# Patient Record
Sex: FEMALE | Race: BLACK OR AFRICAN AMERICAN | NOT HISPANIC OR LATINO | URBAN - METROPOLITAN AREA
[De-identification: names, ages, dates, MRNs, and addresses within clinical notes are randomized per-mention and may not be internally consistent; named-entity substitution may affect disease eponyms.]

---

## 2021-05-03 ENCOUNTER — TELEPHONE ENCOUNTER (OUTPATIENT)
Dept: URBAN - METROPOLITAN AREA CLINIC 92 | Facility: CLINIC | Age: 56
End: 2021-05-03

## 2022-04-30 ENCOUNTER — TELEPHONE ENCOUNTER (OUTPATIENT)
Dept: URBAN - METROPOLITAN AREA CLINIC 121 | Facility: CLINIC | Age: 57
End: 2022-04-30

## 2022-05-01 ENCOUNTER — TELEPHONE ENCOUNTER (OUTPATIENT)
Dept: URBAN - METROPOLITAN AREA CLINIC 121 | Facility: CLINIC | Age: 57
End: 2022-05-01

## 2022-05-01 RX ORDER — METRONIDAZOLE 500 MG/1
TABLET ORAL
Status: ACTIVE | COMMUNITY
Start: 2007-05-09

## 2022-05-01 RX ORDER — LEVOTHYROXINE SODIUM 25 UG/1
UNSURE OF DOSAGE TABLET ORAL
Status: ACTIVE | COMMUNITY
Start: 2007-05-09

## 2022-05-01 RX ORDER — IBUPROFEN 200 MG/1
UNSURE OF DOSAGE TABLET, FILM COATED ORAL
Status: ACTIVE | COMMUNITY
Start: 2007-05-09

## 2022-05-01 RX ORDER — AMLODIPINE BESYLATE 2.5 MG
UNSURE OF DOSAGE TABLET ORAL
Status: ACTIVE | COMMUNITY
Start: 2007-05-09

## 2022-05-01 RX ORDER — HYDROCHLOROTHIAZIDE 25 MG/1
UNSURE OF DOSAGE TABLET ORAL
Status: ACTIVE | COMMUNITY
Start: 2007-05-09

## 2022-06-13 ENCOUNTER — OFFICE VISIT (OUTPATIENT)
Dept: URBAN - METROPOLITAN AREA CLINIC 118 | Facility: CLINIC | Age: 57
End: 2022-06-13
Payer: COMMERCIAL

## 2022-06-13 ENCOUNTER — DASHBOARD ENCOUNTERS (OUTPATIENT)
Age: 57
End: 2022-06-13

## 2022-06-13 VITALS
DIASTOLIC BLOOD PRESSURE: 76 MMHG | BODY MASS INDEX: 37.88 KG/M2 | HEIGHT: 70 IN | HEART RATE: 82 BPM | WEIGHT: 264.6 LBS | TEMPERATURE: 97.2 F | SYSTOLIC BLOOD PRESSURE: 117 MMHG

## 2022-06-13 DIAGNOSIS — R19.7 DIARRHEA, UNSPECIFIED TYPE: ICD-10-CM

## 2022-06-13 DIAGNOSIS — K92.1 BLOOD IN STOOL: ICD-10-CM

## 2022-06-13 PROCEDURE — 46611 ANOSCOPY: CPT | Performed by: INTERNAL MEDICINE

## 2022-06-13 PROCEDURE — 99204 OFFICE O/P NEW MOD 45 MIN: CPT | Performed by: INTERNAL MEDICINE

## 2022-06-13 RX ORDER — AMLODIPINE BESYLATE 2.5 MG
UNSURE OF DOSAGE TABLET ORAL
Status: ON HOLD | COMMUNITY
Start: 2007-05-09

## 2022-06-13 RX ORDER — METRONIDAZOLE 500 MG/1
TABLET ORAL
Status: ON HOLD | COMMUNITY
Start: 2007-05-09

## 2022-06-13 RX ORDER — HYDROCHLOROTHIAZIDE 25 MG/1
UNSURE OF DOSAGE TABLET ORAL
Status: ACTIVE | COMMUNITY
Start: 2007-05-09

## 2022-06-13 RX ORDER — IBUPROFEN 200 MG/1
UNSURE OF DOSAGE TABLET, FILM COATED ORAL
Status: ON HOLD | COMMUNITY
Start: 2007-05-09

## 2022-06-13 RX ORDER — LEVOTHYROXINE SODIUM 25 UG/1
UNSURE OF DOSAGE TABLET ORAL
Status: ACTIVE | COMMUNITY
Start: 2007-05-09

## 2022-06-13 NOTE — HPI-TODAY'S VISIT:
patient had last colon 1/2016, hyperplastic polyp repeat in 10 years, and diverticulosis went to ER a few weeks ago,had uti and was given abx, saw a little blood in the stool with this she reports doing well overall did have some diarrhea at that time too, though that's improving

## 2022-06-13 NOTE — EXAM-PHYSICAL EXAM
Antonietta in room as chaparone normal ext exam anoscopy performed using self lighted anoscope small grade 1 RP, no proctitis no blood in the rectal vault

## 2022-06-14 ENCOUNTER — OFFICE VISIT (OUTPATIENT)
Dept: URBAN - METROPOLITAN AREA CLINIC 84 | Facility: CLINIC | Age: 57
End: 2022-06-14

## 2022-11-03 ENCOUNTER — TELEPHONE ENCOUNTER (OUTPATIENT)
Dept: URBAN - METROPOLITAN AREA CLINIC 118 | Facility: CLINIC | Age: 57
End: 2022-11-03